# Patient Record
Sex: FEMALE | Race: AMERICAN INDIAN OR ALASKA NATIVE | NOT HISPANIC OR LATINO | Employment: UNEMPLOYED | URBAN - METROPOLITAN AREA
[De-identification: names, ages, dates, MRNs, and addresses within clinical notes are randomized per-mention and may not be internally consistent; named-entity substitution may affect disease eponyms.]

---

## 2022-09-23 ENCOUNTER — HOSPITAL ENCOUNTER (EMERGENCY)
Facility: MEDICAL CENTER | Age: 22
End: 2022-09-24
Attending: EMERGENCY MEDICINE
Payer: MEDICAID

## 2022-09-23 DIAGNOSIS — Z00.8 MEDICAL CLEARANCE FOR INCARCERATION: ICD-10-CM

## 2022-09-23 DIAGNOSIS — L03.115 CELLULITIS OF RIGHT LOWER EXTREMITY: ICD-10-CM

## 2022-09-23 PROCEDURE — 700102 HCHG RX REV CODE 250 W/ 637 OVERRIDE(OP): Performed by: EMERGENCY MEDICINE

## 2022-09-23 PROCEDURE — 99283 EMERGENCY DEPT VISIT LOW MDM: CPT

## 2022-09-23 PROCEDURE — A9270 NON-COVERED ITEM OR SERVICE: HCPCS | Performed by: EMERGENCY MEDICINE

## 2022-09-23 RX ORDER — CEPHALEXIN 500 MG/1
500 CAPSULE ORAL 4 TIMES DAILY
Qty: 20 CAPSULE | Refills: 0 | Status: SHIPPED | OUTPATIENT
Start: 2022-09-23 | End: 2022-09-28

## 2022-09-23 RX ORDER — CEPHALEXIN 500 MG/1
500 CAPSULE ORAL ONCE
Status: COMPLETED | OUTPATIENT
Start: 2022-09-23 | End: 2022-09-23

## 2022-09-23 RX ADMIN — CEPHALEXIN 500 MG: 500 CAPSULE ORAL at 23:40

## 2022-09-23 ASSESSMENT — PAIN SCALES - WONG BAKER
WONGBAKER_NUMERICALRESPONSE: DOESN'T HURT AT ALL
WONGBAKER_NUMERICALRESPONSE: DOESN'T HURT AT ALL

## 2022-09-24 VITALS
OXYGEN SATURATION: 99 % | WEIGHT: 180 LBS | HEART RATE: 64 BPM | HEIGHT: 66 IN | SYSTOLIC BLOOD PRESSURE: 117 MMHG | TEMPERATURE: 97.9 F | RESPIRATION RATE: 16 BRPM | BODY MASS INDEX: 28.93 KG/M2 | DIASTOLIC BLOOD PRESSURE: 71 MMHG

## 2022-09-24 NOTE — ED NOTES
The pt is alert and oriented. The pt hooked up to the monitor. The pt is calm and cooperative. PD at bedside. Vitals stable. Wound noted on the foot, no bleeding noted. Positive csm in the affected extremity

## 2022-09-24 NOTE — ED TRIAGE NOTES
Zoë Haney  22 y.o.  Chief Complaint   Patient presents with    Medical Clearance     Ambulatory with PD for above, pt has open wound on R anterior ankle. Per PD, need to assess for infection before admitting to [facility]. Ankle is swollen and painful, pt is ambulatory with steady gait.

## 2022-09-24 NOTE — DISCHARGE INSTRUCTIONS
You were seen in the ED today for a rash.  Your rash appears to be cellulitis, which is an infection of the skin. You have been given a prescription for antibiotics which you will need to continue to take at home. Please take all of the antibiotics as prescribed.     Your infection should begin to improve within 2 days. Your rash may spread a small amount before improving. It should not increase in size more than 25%.    Please return to the ED if you have any worsening symptoms, spreading rash, fever, increasing pain, numbness or other concerns.

## 2022-09-24 NOTE — ED NOTES
"Pt discharged to PD. The pt is alert and oriented and ambulates with a steady gait. Vitals stable and on room air. Dressing on wound is clean dry and intact. pt in possession of belongings. Pt provided discharge education and information pertaining to medications and follow up appointments. Pt received copy of discharge instructions and verbalized understanding. /71   Pulse 64   Temp 36.6 °C (97.9 °F) (Oral)   Resp 16   Ht 1.676 m (5' 6\")   Wt 81.6 kg (180 lb)   SpO2 99%   BMI 29.05 kg/m²     "

## 2022-09-24 NOTE — ED PROVIDER NOTES
ED Provider Note    Scribed for Danny Gonzalez M.D. by Vashti Healy. 9/23/2022,  11:19 PM.    Means of Arrival: Walk-in  History obtained from: Patient  History limited by: None    CHIEF COMPLAINT  Chief Complaint   Patient presents with    Medical Clearance     HPI  Zoë Haney is a 22 y.o. female who presents to the Emergency Department for medical clearance for admission to [facility]. The patient has symptoms of an open wound, rash to her right anterior ankle. She reports a history of similar symptoms. She denies any recent drainage. She reports that she has been cleaning it out with alcohol and applying neosporin.     REVIEW OF SYSTEMS  CONSTITUTIONAL:  No fever.  CARDIOVASCULAR:  No chest discomfort.  RESPIRATORY:  No pleuritic chest pain.  GASTROINTESTINAL:  No abdominal pain.  EXTREMITIES: Open wound, rash of the right anterior ankle.     See HPI for further details.     PAST MEDICAL HISTORY  Past Medical History:   Diagnosis Date    Dental disorder     current issue     FAMILY HISTORY  History reviewed. No pertinent family history.    SOCIAL HISTORY   reports that she has been smoking cigarettes. She has been smoking an average of .1 packs per day. She has never used smokeless tobacco. She reports current drug use. She reports that she does not drink alcohol.    SURGICAL HISTORY  Past Surgical History:   Procedure Laterality Date    DENTAL RESTORATION  4/9/2015    Performed by Benoit Quezada D.D.S. at SURGERY SAME DAY [facility]     CURRENT MEDICATIONS  Home Medications       Reviewed by Janett Spencer R.N. (Registered Nurse) on 09/23/22 at 2308  Med List Status: Not Addressed     Medication Last Dose Status   amoxicillin-clavulanate suspension (AUGMENTIN) 250-62.5 MG/5ML SUSR  Active   hydrocodone-acetaminophen 2.5-108 mg/5mL (HYCET) 7.5-325 MG/15ML solution  Active                  ALLERGIES  No Known Allergies    PHYSICAL EXAM  VITAL SIGNS: /79   Pulse 75   Temp 36.1 °C (97 °F)  "(Temporal)   Resp 16   Ht 1.676 m (5' 6\")   Wt 81.6 kg (180 lb)   SpO2 99%   BMI 29.05 kg/m²    Gen: alert, no acute distress  HENT: ATNC  Eyes: normal conjuctiva  Resp: No respiratory distress.   CV: No JVD   Abd: Non-distended  Extremities: No deformity.  Area of erythema with open wound anterior right ankle.  Normal range of motion of the ankle.  No proximal streaking.  No fluctuance.  No purulent discharge.    COURSE & MEDICAL DECISION MAKING  Pertinent Labs & Imaging studies reviewed. (See chart for details)    11:20 PM - Patient seen and examined at bedside. I discussed with the patient that her rash looks infected and rinsing it with alcohol may make it worse. I instructed her to rinse it with soap and water. I informed her that I will prescribe her an antibiotic which she should take and see improvements with 2 days. I informed her that her rash may spread a small amount before it begins to improve. I discussed plan for discharge and follow up as outlined below. The patient is stable for discharge at this time and will return for spreading rash, fever, increasing pain, numbness or any new or worsening symptoms. Patient verbalizes understanding and support with my plan for discharge.     Medical Decision Making:  Patient presents with what appears to be cellulitis with an open wound to the right ankle.  No evidence of deep space infection or septic arthritis.  She denies any discharge that would suggest a purulent infection, will treat with cephalexin for likely strep skin infection.  Patient is medically cleared for incarceration.    I wore the appropriate PPE during this encounter.     The patient will return for new or worsening symptoms and is stable at the time of discharge.    The patient is referred to a primary physician for diabetic screening and for all other preventative health concerns.    DISPOSITION:  Patient will be discharged home in stable condition.    FOLLOW UP:  [facility]" [facility], Emergency Dept  [address] [address]  [phone]    If symptoms worsen    Your regular doctor.  To establish a primary care provider within our system, please call [phone]    Schedule an appointment as soon as possible for a visit     OUTPATIENT MEDICATIONS:  New Prescriptions    CEPHALEXIN (KEFLEX) 500 MG CAP    Take 1 Capsule by mouth 4 times a day for 5 days.     FINAL IMPRESSION  1. Medical clearance for incarceration    2. Cellulitis of right lower extremity      IVashti (Eliel), am scribing for, and in the presence of, Danny Gonzalez M.D..    Electronically signed by: Vashti Healy (Rinkuibfahad), 9/23/2022    IDanny M.D. personally performed the services described in this documentation, as scribed by Vashti Healy in my presence, and it is both accurate and complete.    The note accurately reflects work and decisions made by me.  Danny Gonzalez M.D.  9/23/2022  11:48 PM    This dictation was created using voice recognition software. The accuracy of the dictation is limited to the abilities of the software. I expect there may be some errors of grammar and possibly content. The nursing notes were reviewed and certain aspects of this information were incorporated into this note.